# Patient Record
Sex: MALE | Race: WHITE | Employment: OTHER | URBAN - METROPOLITAN AREA
[De-identification: names, ages, dates, MRNs, and addresses within clinical notes are randomized per-mention and may not be internally consistent; named-entity substitution may affect disease eponyms.]

---

## 2022-09-27 ENCOUNTER — HOSPITAL ENCOUNTER (EMERGENCY)
Age: 72
Discharge: HOME | End: 2022-09-27
Attending: STUDENT IN AN ORGANIZED HEALTH CARE EDUCATION/TRAINING PROGRAM
Payer: MEDICARE

## 2022-09-27 VITALS
WEIGHT: 225 LBS | SYSTOLIC BLOOD PRESSURE: 178 MMHG | DIASTOLIC BLOOD PRESSURE: 90 MMHG | HEIGHT: 72 IN | BODY MASS INDEX: 30.48 KG/M2 | OXYGEN SATURATION: 100 % | TEMPERATURE: 98 F | HEART RATE: 66 BPM | RESPIRATION RATE: 16 BRPM

## 2022-09-27 DIAGNOSIS — N39.0 URINARY TRACT INFECTION WITHOUT HEMATURIA, SITE UNSPECIFIED: Primary | ICD-10-CM

## 2022-09-27 LAB
APPEARANCE UR: CLEAR
BACTERIA URNS QL MICRO: ABNORMAL /HPF
BILIRUB UR QL: NEGATIVE
COLOR UR: ABNORMAL
EPITH CASTS URNS QL MICRO: ABNORMAL /LPF
GLUCOSE UR STRIP.AUTO-MCNC: NEGATIVE MG/DL
HGB UR QL STRIP: NEGATIVE
KETONES UR QL STRIP.AUTO: NEGATIVE MG/DL
LEUKOCYTE ESTERASE UR QL STRIP.AUTO: ABNORMAL
NITRITE UR QL STRIP.AUTO: NEGATIVE
PH UR STRIP: 6 [PH] (ref 5–8)
PROT UR STRIP-MCNC: NEGATIVE MG/DL
RBC #/AREA URNS HPF: ABNORMAL /HPF (ref 0–5)
SP GR UR REFRACTOMETRY: <1.005 (ref 1–1.03)
UA: UC IF INDICATED,UAUC: ABNORMAL
UROBILINOGEN UR QL STRIP.AUTO: 0.2 EU/DL (ref 0.2–1)
WBC URNS QL MICRO: ABNORMAL /HPF (ref 0–4)

## 2022-09-27 PROCEDURE — 87186 SC STD MICRODIL/AGAR DIL: CPT

## 2022-09-27 PROCEDURE — 81001 URINALYSIS AUTO W/SCOPE: CPT

## 2022-09-27 PROCEDURE — 87077 CULTURE AEROBIC IDENTIFY: CPT

## 2022-09-27 PROCEDURE — 99283 EMERGENCY DEPT VISIT LOW MDM: CPT

## 2022-09-27 PROCEDURE — 74011250637 HC RX REV CODE- 250/637: Performed by: STUDENT IN AN ORGANIZED HEALTH CARE EDUCATION/TRAINING PROGRAM

## 2022-09-27 PROCEDURE — 87086 URINE CULTURE/COLONY COUNT: CPT

## 2022-09-27 RX ORDER — LEVOFLOXACIN 750 MG/1
750 TABLET ORAL
Status: COMPLETED | OUTPATIENT
Start: 2022-09-27 | End: 2022-09-27

## 2022-09-27 RX ORDER — LEVOFLOXACIN 750 MG/1
750 TABLET ORAL DAILY
Qty: 4 TABLET | Refills: 0 | Status: SHIPPED | OUTPATIENT
Start: 2022-09-27 | End: 2022-10-01

## 2022-09-27 RX ADMIN — LEVOFLOXACIN 750 MG: 750 TABLET, FILM COATED ORAL at 21:25

## 2022-09-27 NOTE — ED NOTES
Patient sent to ER by [facility] because patients UTI did not clear up after taking amoxicillin-clav 875. Per patient they wanted him to come to ER to either try an abx derivative that he could be allergic to and the ER could give him \"adrenalin\" or ER could give IV abx.

## 2022-09-27 NOTE — ED PROVIDER NOTES
HPI     Date of Service:  9/27/2022    Patient:  Manisha Turner    Chief Complaint:  UTI       HPI:  Manisha Turner is a 81 y.o.  male with who presents for evaluation of UTI. Patient was treated at the beginning of September for urinary tract infection, he completed entire course of Augmentin. Patient had a follow-up UA today which showed persistent UTI with 2+ bacteria, trace leukocyte esterase and 11-20 WBCs. Sensitivities from culture on 9/1/2022 positive for Klebsiella pneumoniae with probable ESBL, intermediate sensitivities with Augmentin multiple resistance. Patient denies any current fevers or chills. No abdominal pain. No nausea or vomiting. He has chronic back pain which is at baseline. Notes he self catheterizes for urinary specimens due to neurogenic bladder. Past Medical History:   Diagnosis Date    Hearing reduced     Musculoskeletal disorder     Neurological disorder        Past Surgical History:   Procedure Laterality Date    HX HEENT      HX ORTHOPAEDIC           No family history on file.     Social History     Socioeconomic History    Marital status:      Spouse name: Not on file    Number of children: Not on file    Years of education: Not on file    Highest education level: Not on file   Occupational History    Not on file   Tobacco Use    Smoking status: Never    Smokeless tobacco: Not on file   Substance and Sexual Activity    Alcohol use: Yes     Comment: socially    Drug use: No    Sexual activity: Yes     Partners: Female   Other Topics Concern    Not on file   Social History Narrative    Not on file     Social Determinants of Health     Financial Resource Strain: Not on file   Food Insecurity: Not on file   Transportation Needs: Not on file   Physical Activity: Not on file   Stress: Not on file   Social Connections: Not on file   Intimate Partner Violence: Not on file   Housing Stability: Not on file         ALLERGIES: Bactrim [sulfamethoprim], Ciprofloxacin, and Ibuprofen    Review of Systems   Constitutional:  Negative for chills and fever. HENT:  Negative for congestion and rhinorrhea. Eyes:  Negative for discharge and redness. Respiratory:  Negative for cough and shortness of breath. Cardiovascular:  Negative for chest pain. Gastrointestinal:  Negative for abdominal pain, diarrhea, nausea and vomiting. Genitourinary:  Negative for dysuria and hematuria. Musculoskeletal:  Positive for back pain. Negative for neck pain. Skin:  Negative for color change and rash. Allergic/Immunologic: Negative for environmental allergies and food allergies. Psychiatric/Behavioral:  Negative for agitation and confusion. Vitals:    09/27/22 1802   BP: (!) 178/90   Pulse: 66   Resp: 18   Temp: 98 °F (36.7 °C)   SpO2: 99%   Weight: 102.1 kg (225 lb)   Height: 6' (1.829 m)            Physical Exam  Vitals and nursing note reviewed. Constitutional:       General: He is not in acute distress. Appearance: Normal appearance. He is not ill-appearing or toxic-appearing. HENT:      Head: Normocephalic and atraumatic. Eyes:      General: No scleral icterus. Conjunctiva/sclera: Conjunctivae normal.   Cardiovascular:      Rate and Rhythm: Normal rate. Pulses: Normal pulses. Pulmonary:      Effort: Pulmonary effort is normal. No respiratory distress. Skin:     General: Skin is warm and dry. Capillary Refill: Capillary refill takes less than 2 seconds. Neurological:      General: No focal deficit present. Mental Status: He is alert and oriented to person, place, and time. Psychiatric:         Mood and Affect: Mood normal.         Behavior: Behavior normal.        MDM  Number of Diagnoses or Management Options  Urinary tract infection without hematuria, site unspecified  Diagnosis management comments:     DECISION MAKING:  Glynn Guillory is a 81 y.o. male who comes in as above. On arrival to the emergency department patient is afebrile.   Vital signs notable for elevated blood pressure at 178/90, otherwise stable. On my examination he is well-appearing, no acute distress. Patient was treated for a urinary tract infection at the beginning of September, completed course of Augmentin. He was instructed on follow-up for repeat UA for treatment of cure. Patient was seen at [facility] for the repeat urinalysis today, which continued to show leukocyte esterase and bacteria. Patient's urine culture from 9/1/2022 showed ESBL Klebsiella pneumoniae with intermediate sensitivities to Augmentin. Patient does have multiple allergies which limits the other antibiotics he can receive. On review of the urine culture, it is sensitive to levofloxacin but patient has reported ciprofloxacin allergy. Urinalysis was obtained here in the emergency department and again confirms UTI with small leukocyte esterase and 2+ bacteria. Urine will be sent for culture. On further discussion with patient regarding his antibiotic allergies, he cannot recall what the reaction to ciprofloxacin was but believes it may have been a rash. Denies recalling any severe allergic or anaphylactic reaction. Patient in agreement to take first dose of levofloxacin here in the emergency department and will monitor for 1 hour for any allergic symptoms. On reevaluation after approximately 1 hour of receiving the levofloxacin, he has no symptoms including skin rash, itching, facial swelling, throat itching or swelling, or difficulty breathing. Patient will be given a course for levofloxacin for treatment of his urinary tract infection. He was instructed to return should he develop any allergic symptoms and was instructed on the signs and symptoms. He was also instructed to follow-up with his urologist for continued evaluation and further treatment as indicated. Patient was given strict ER return precautions. He verbalized understanding and was discharged.          Amount and/or Complexity of Data Reviewed  Clinical lab tests: reviewed           Procedures          LABS:  Recent Results (from the past 6 hour(s))   URINALYSIS W/ REFLEX CULTURE    Collection Time: 09/27/22  7:31 PM    Specimen: Urine   Result Value Ref Range    Color YELLOW/STRAW      Appearance CLEAR CLEAR      Specific gravity <1.005 1.003 - 1.030    pH (UA) 6.0 5.0 - 8.0      Protein Negative NEG mg/dL    Glucose Negative NEG mg/dL    Ketone Negative NEG mg/dL    Bilirubin Negative NEG      Blood Negative NEG      Urobilinogen 0.2 0.2 - 1.0 EU/dL    Nitrites Negative NEG      Leukocyte Esterase SMALL (A) NEG      WBC 10-20 0 - 4 /hpf    RBC 0-5 0 - 5 /hpf    Epithelial cells FEW FEW /lpf    Bacteria 2+ (A) NEG /hpf    UA:UC IF INDICATED URINE CULTURE ORDERED          IMAGING:  No orders to display         Medications During Visit:  Medications   levoFLOXacin (LEVAQUIN) tablet 750 mg (750 mg Oral Given 9/27/22 2125)         IMPRESSION:  UTI    DISPOSITION:  Discharged       Current Discharge Medication List        START taking these medications    Details   levoFLOXacin (LEVAQUIN) 750 mg tablet Take 1 Tablet by mouth daily for 4 days. Next dose due evening of 9/28  Qty: 4 Tablet, Refills: 0  Start date: 9/27/2022, End date: 10/1/2022              Follow-up Information       Follow up With Specialties Details Why Contact Info    Gunjan Lemon MD Internal Medicine Physician Schedule an appointment as soon as possible for a visit   [address] [place] [address]  [phone]      Your urolgoist                  The patient is asked to follow-up with their primary care provider in the next several days. They are to call tomorrow for an appointment. The patient is asked to return promptly for any increased concerns or worsening of symptoms. They can return to this emergency department or any other emergency department.      Eloisa Santos,

## 2022-09-27 NOTE — ED TRIAGE NOTES
Seen at [facility] and treated for a uti on 9/1 and treated with Amox-Asif 875-125mg and went back today with no improvement; reports challenging allergies.

## 2022-09-28 NOTE — DISCHARGE INSTRUCTIONS
Please take antibiotic as prescribed. Follow-up with your primary care doctor and neurologist for ER follow-up visit and further management of UTI. If you develop rash, skin itching, facial swelling, difficulty breathing, throat itching or swelling these are signs of an allergic reaction and return to the emergency department if they occur.

## 2022-09-30 LAB
BACTERIA SPEC CULT: ABNORMAL
BACTERIA SPEC CULT: ABNORMAL
CC UR VC: ABNORMAL
SERVICE CMNT-IMP: ABNORMAL

## 2023-03-18 ENCOUNTER — HOSPITAL ENCOUNTER (EMERGENCY)
Age: 73
Discharge: HOME | End: 2023-03-18
Attending: EMERGENCY MEDICINE
Payer: MEDICARE

## 2023-03-18 ENCOUNTER — APPOINTMENT (OUTPATIENT)
Dept: GENERAL RADIOLOGY | Age: 73
End: 2023-03-18
Attending: EMERGENCY MEDICINE
Payer: MEDICARE

## 2023-03-18 ENCOUNTER — APPOINTMENT (OUTPATIENT)
Dept: ULTRASOUND IMAGING | Age: 73
End: 2023-03-18
Attending: EMERGENCY MEDICINE
Payer: MEDICARE

## 2023-03-18 VITALS
BODY MASS INDEX: 33.86 KG/M2 | DIASTOLIC BLOOD PRESSURE: 92 MMHG | OXYGEN SATURATION: 96 % | HEART RATE: 85 BPM | SYSTOLIC BLOOD PRESSURE: 153 MMHG | HEIGHT: 72 IN | TEMPERATURE: 98.6 F | WEIGHT: 250 LBS | RESPIRATION RATE: 18 BRPM

## 2023-03-18 DIAGNOSIS — I89.0 LYMPHEDEMA: Primary | ICD-10-CM

## 2023-03-18 LAB
ANION GAP SERPL CALC-SCNC: 13 MMOL/L (ref 5–15)
BASOPHILS # BLD: 0 K/UL (ref 0–1)
BASOPHILS NFR BLD: 1 % (ref 0–1)
BUN SERPL-MCNC: 21 MG/DL (ref 8–23)
BUN/CREAT SERPL: 27 (ref 12–20)
CALCIUM SERPL-MCNC: 10 MG/DL (ref 8.8–10.2)
CHLORIDE SERPL-SCNC: 99 MMOL/L (ref 98–107)
CO2 SERPL-SCNC: 29 MMOL/L (ref 22–29)
CREAT SERPL-MCNC: 0.79 MG/DL (ref 0.7–1.2)
DIFFERENTIAL METHOD BLD: NORMAL
EOSINOPHIL # BLD: 0 K/UL (ref 0–0.4)
EOSINOPHIL NFR BLD: 1 % (ref 0–7)
ERYTHROCYTE [DISTWIDTH] IN BLOOD BY AUTOMATED COUNT: 12.9 % (ref 11.5–14.5)
GLUCOSE SERPL-MCNC: 121 MG/DL (ref 65–100)
HCT VFR BLD AUTO: 46.9 % (ref 36.6–50.3)
HGB BLD-MCNC: 16.1 G/DL (ref 12.1–17)
IMM GRANULOCYTES # BLD AUTO: 0 K/UL (ref 0–0.04)
IMM GRANULOCYTES NFR BLD AUTO: 0 % (ref 0–0.5)
LYMPHOCYTES # BLD: 1.2 K/UL (ref 0.8–3.5)
LYMPHOCYTES NFR BLD: 19 % (ref 12–49)
MCH RBC QN AUTO: 31.6 PG (ref 26–34)
MCHC RBC AUTO-ENTMCNC: 34.3 G/DL (ref 30–36.5)
MCV RBC AUTO: 92 FL (ref 80–99)
MONOCYTES # BLD: 0.5 K/UL (ref 0–1)
MONOCYTES NFR BLD: 8 % (ref 5–13)
NEUTS SEG # BLD: 4.4 K/UL (ref 1.8–8)
NEUTS SEG NFR BLD: 71 % (ref 32–75)
NRBC # BLD: 0 K/UL (ref 0–0.01)
NRBC BLD-RTO: 0 PER 100 WBC
PLATELET # BLD AUTO: 210 K/UL (ref 150–400)
PMV BLD AUTO: 10 FL (ref 8.9–12.9)
POTASSIUM SERPL-SCNC: 3.4 MMOL/L (ref 3.5–5.1)
RBC # BLD AUTO: 5.1 M/UL (ref 4.1–5.7)
SODIUM SERPL-SCNC: 141 MMOL/L (ref 136–145)
WBC # BLD AUTO: 6.2 K/UL (ref 4.1–11.1)

## 2023-03-18 PROCEDURE — 36415 COLL VENOUS BLD VENIPUNCTURE: CPT

## 2023-03-18 PROCEDURE — 99284 EMERGENCY DEPT VISIT MOD MDM: CPT

## 2023-03-18 PROCEDURE — 93971 EXTREMITY STUDY: CPT

## 2023-03-18 PROCEDURE — 73610 X-RAY EXAM OF ANKLE: CPT

## 2023-03-18 PROCEDURE — 85025 COMPLETE CBC W/AUTO DIFF WBC: CPT

## 2023-03-18 PROCEDURE — 80048 BASIC METABOLIC PNL TOTAL CA: CPT

## 2023-03-18 NOTE — ED NOTES
Pt provided discharge instructions, (no)prescriptions, education and follow up information. Pt verbalizing understanding. Pt A&Ox4, RA. Pain controlled. Patient utilized wheelchair out of ER.

## 2023-03-18 NOTE — ED PROVIDER NOTES
51-year-old male with history of paraplegia presents to the emergency department with his wife with concern for worsening pain and swelling in his right lower extremity. He has had some overlying skin color changes for the past year. Recently started on doxycycline for UTI. He wears compression socks generally to help with his swelling. Has wound care nurses come to the house per his primary care doctor. He has never been to lymphedema clinic. The history is provided by the patient, medical records and the spouse. Leg Pain        Past Medical History:   Diagnosis Date    Hearing reduced     Musculoskeletal disorder     Neurological disorder        Past Surgical History:   Procedure Laterality Date    HX HEENT      HX ORTHOPAEDIC           History reviewed. No pertinent family history.     Social History     Socioeconomic History    Marital status:      Spouse name: Not on file    Number of children: Not on file    Years of education: Not on file    Highest education level: Not on file   Occupational History    Not on file   Tobacco Use    Smoking status: Never    Smokeless tobacco: Not on file   Vaping Use    Vaping Use: Never used   Substance and Sexual Activity    Alcohol use: Yes     Comment: socially    Drug use: No    Sexual activity: Yes     Partners: Female   Other Topics Concern    Not on file   Social History Narrative    Not on file     Social Determinants of Health     Financial Resource Strain: Not on file   Food Insecurity: Not on file   Transportation Needs: Not on file   Physical Activity: Not on file   Stress: Not on file   Social Connections: Not on file   Intimate Partner Violence: Not on file   Housing Stability: Not on file         ALLERGIES: Bactrim [sulfamethoprim], Ciprofloxacin, and Ibuprofen    Review of Systems    Vitals:    03/18/23 1558   BP: (!) 153/92   Pulse: 85   Resp: 18   Temp: 98.6 °F (37 °C)   SpO2: 96%   Weight: 113.4 kg (250 lb)   Height: 6' (1.829 m) Physical Exam  Vitals and nursing note reviewed. Constitutional:       Appearance: Normal appearance. Cardiovascular:      Rate and Rhythm: Normal rate. Pulmonary:      Effort: Pulmonary effort is normal. No respiratory distress. Musculoskeletal:      Right lower leg: Edema present. Left lower leg: Edema present. Comments: Bilateral lower extremity pitting edema. There is mild warmth of the right lower extremity with some overlying skin changes consistent with stasis dermatitis   Neurological:      Mental Status: He is alert. Medical Decision Making  81year old male presents as above with bilateral lower extremity edema, right greater than left. Work up most consistent with lymphedema. No DVT on ultrasound. Ankle xray reassuring. Will refer to lymphedema clinic. Already on antibiotics that should cover for skin nikolas if this does represent cellulitis. Amount and/or Complexity of Data Reviewed  Labs: ordered. Radiology: ordered and independent interpretation performed. Decision-making details documented in ED Course. ECG/medicine tests: ordered. ED Course as of 03/18/23 1755   Sat Mar 18, 2023   1729 I have independently viewed the obtained radiographic images and note ankle x-ray without acute findings. Will await radiology read.  [JM]      ED Course User Index  [JM] Siri Hanks MD       Procedures

## 2023-03-18 NOTE — ED NOTES
Patient is a paraplegic, has no sensation in BLE. Skin intact, erythematous, blanchable. Sitting upright in motorized wheelchair.